# Patient Record
Sex: FEMALE | Race: WHITE | NOT HISPANIC OR LATINO | Employment: OTHER | URBAN - NONMETROPOLITAN AREA
[De-identification: names, ages, dates, MRNs, and addresses within clinical notes are randomized per-mention and may not be internally consistent; named-entity substitution may affect disease eponyms.]

---

## 2022-07-12 ENCOUNTER — OFFICE VISIT (OUTPATIENT)
Dept: URGENT CARE | Facility: CLINIC | Age: 67
End: 2022-07-12
Payer: MEDICARE

## 2022-07-12 VITALS
WEIGHT: 136.7 LBS | HEART RATE: 110 BPM | DIASTOLIC BLOOD PRESSURE: 82 MMHG | HEIGHT: 64 IN | BODY MASS INDEX: 23.34 KG/M2 | RESPIRATION RATE: 16 BRPM | OXYGEN SATURATION: 97 % | TEMPERATURE: 99.3 F | SYSTOLIC BLOOD PRESSURE: 152 MMHG

## 2022-07-12 DIAGNOSIS — S50.862A INSECT BITE OF FOREARM WITH LOCAL REACTION, LEFT, INITIAL ENCOUNTER: ICD-10-CM
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
## 2022-07-12 PROBLEM — E78.2 MIXED HYPERLIPIDEMIA: Status: ACTIVE | Noted: 2021-06-17

## 2022-07-12 PROBLEM — E03.9 HYPOTHYROIDISM: Status: ACTIVE | Noted: 2021-06-17

## 2022-07-12 PROBLEM — I10 ESSENTIAL HYPERTENSION: Status: ACTIVE | Noted: 2021-06-17

## 2022-07-12 PROCEDURE — 99203 OFFICE O/P NEW LOW 30 MIN: CPT | Performed by: NURSE PRACTITIONER

## 2022-07-12 RX ORDER — CEPHALEXIN 500 MG/1
500 CAPSULE ORAL 4 TIMES DAILY
Qty: 20 CAPSULE | Refills: 0 | Status: SHIPPED | OUTPATIENT
Start: 2022-07-12 | End: 2022-07-17

## 2022-07-12 RX ORDER — IRBESARTAN 300 MG/1
300 TABLET ORAL DAILY
COMMUNITY
Start: 2022-02-22

## 2022-07-12 RX ORDER — TRIAMCINOLONE ACETONIDE 1 MG/G
1 CREAM TOPICAL 2 TIMES DAILY
Qty: 28.4 G | Refills: 0 | Status: SHIPPED | OUTPATIENT
Start: 2022-07-12 | End: 2022-07-19

## 2022-07-12 RX ORDER — AMLODIPINE BESYLATE 2.5 MG/1
1.25 TABLET ORAL DAILY
COMMUNITY
Start: 2022-02-22

## 2022-07-12 RX ORDER — LEVOTHYROXINE SODIUM 0.05 MG/1
50 TABLET ORAL DAILY
COMMUNITY
Start: 2022-02-22

## 2022-07-12 ASSESSMENT — ENCOUNTER SYMPTOMS: CONSTITUTIONAL NEGATIVE: 1

## 2022-07-12 ASSESSMENT — VISUAL ACUITY: OU: 1

## 2022-07-13 NOTE — PROGRESS NOTES
Subjective:     Lor House is a 67 y.o. female who presents for Insect Bite (Left forearm, onset yesterday)       Other  This is a new problem. The problem has been gradually worsening.     Yesterday, patient was moving some boxes when she felt a pain at her left forearm.  Thought she cut herself.  However, later that day noticed a blister with surrounding redness and swelling.  Mildly itchy.  Applied ice and Neosporin.  She thought she saw red streaking spreading toward her hand which has resolved.    Tdap received 1/20/2016 per chart review.    Review of Systems   Constitutional: Negative.    Skin: Positive for itching.        Insect bite at left forearm, blistering, redness, swelling   All other systems reviewed and are negative.    Refer to HPI for additional details.    During this visit, appropriate PPE was worn, hand hygiene was performed, and the patient and any visitors were masked.    PMH:  has no past medical history on file.    MEDS:   Current Outpatient Medications:   •  amLODIPine (NORVASC) 2.5 MG Tab, Take 1.25 mg by mouth every day., Disp: , Rfl:   •  irbesartan (AVAPRO) 300 MG Tab, Take 300 mg by mouth every day., Disp: , Rfl:   •  levothyroxine (SYNTHROID) 50 MCG Tab, Take 50 mcg by mouth every day., Disp: , Rfl:   •  triamcinolone acetonide (KENALOG) 0.1 % Cream, Apply 1 Application topically 2 times a day for 7 days., Disp: 28.4 g, Rfl: 0  •  cephALEXin (KEFLEX) 500 MG Cap, Take 1 Capsule by mouth 4 times a day for 5 days., Disp: 20 Capsule, Rfl: 0    ALLERGIES: No Known Allergies  SURGHX: History reviewed. No pertinent surgical history.  SOCHX:  reports that she has never smoked. She has never used smokeless tobacco. She reports current alcohol use of about 1.2 oz of alcohol per week. She reports that she does not use drugs.    FH: Per HPI as applicable/pertinent.      Objective:     BP (!) 152/82 (BP Location: Right arm, Patient Position: Sitting, BP Cuff Size: Adult)   Pulse (!) 110    "Temp 37.4 °C (99.3 °F) (Temporal)   Resp 16   Ht 1.626 m (5' 4\")   Wt 62 kg (136 lb 11.2 oz)   SpO2 97%   BMI 23.46 kg/m²     Physical Exam  Nursing note reviewed.   Constitutional:       General: She is not in acute distress.     Appearance: She is well-developed. She is not ill-appearing or toxic-appearing.   Eyes:      General: Vision grossly intact.   Cardiovascular:      Rate and Rhythm: Tachycardia present.   Pulmonary:      Effort: Pulmonary effort is normal. No respiratory distress.   Musculoskeletal:         General: No deformity. Normal range of motion.      Cervical back: Normal range of motion.   Skin:     General: Skin is warm and dry.      Coloration: Skin is not pale.      Comments: Approx 3 x 3 cm circular area of erythema, induration, and central urticarial swelling vs early blistering at left forearm, no red streaking; CMS intact distally   Neurological:      Mental Status: She is alert and oriented to person, place, and time.      Motor: No weakness.   Psychiatric:         Behavior: Behavior normal. Behavior is cooperative.       Assessment/Plan:     1. Insect bite of forearm with local reaction, left, initial encounter  - triamcinolone acetonide (KENALOG) 0.1 % Cream; Apply 1 Application topically 2 times a day for 7 days.  Dispense: 28.4 g; Refill: 0  - cephALEXin (KEFLEX) 500 MG Cap; Take 1 Capsule by mouth 4 times a day for 5 days.  Dispense: 20 Capsule; Refill: 0    Likely localized reaction to unknown insect. Rx as above sent electronically. Contingent antibiotic for use upon meeting guidelines as discussed.    Warning signs reviewed. Return precautions discussed.     Differential diagnosis, natural history, supportive care, over-the-counter symptom management per 's instructions, close monitoring, and indications for immediate follow-up discussed.     All questions answered. Patient agrees with the plan of care.    Advised to obtain discharge summary at the  prior " to leaving.